# Patient Record
Sex: FEMALE | Race: WHITE | NOT HISPANIC OR LATINO | Employment: OTHER | URBAN - METROPOLITAN AREA
[De-identification: names, ages, dates, MRNs, and addresses within clinical notes are randomized per-mention and may not be internally consistent; named-entity substitution may affect disease eponyms.]

---

## 2023-03-09 ENCOUNTER — TELEPHONE (OUTPATIENT)
Dept: PRIMARY CARE | Facility: CLINIC | Age: 63
End: 2023-03-09
Payer: MEDICARE

## 2023-03-09 LAB
ANION GAP IN SER/PLAS: 13 MMOL/L (ref 10–20)
CALCIUM (MG/DL) IN SER/PLAS: 9.3 MG/DL (ref 8.6–10.3)
CARBON DIOXIDE, TOTAL (MMOL/L) IN SER/PLAS: 34 MMOL/L (ref 21–32)
CHLORIDE (MMOL/L) IN SER/PLAS: 93 MMOL/L (ref 98–107)
CREATININE (MG/DL) IN SER/PLAS: 0.41 MG/DL (ref 0.5–1.05)
GFR FEMALE: >90 ML/MIN/1.73M2
GLUCOSE (MG/DL) IN SER/PLAS: 112 MG/DL (ref 74–99)
POTASSIUM (MMOL/L) IN SER/PLAS: 3.8 MMOL/L (ref 3.5–5.3)
SODIUM (MMOL/L) IN SER/PLAS: 136 MMOL/L (ref 136–145)
UREA NITROGEN (MG/DL) IN SER/PLAS: 5 MG/DL (ref 6–23)

## 2023-03-09 NOTE — TELEPHONE ENCOUNTER
Per HIPAA, Left message on patient's answering machine.        ----- Message from Blane Morales MD sent at 3/9/2023  3:28 PM EST -----  Let her know her sugar is better and she can schedule a nurse visit to come in for a Kenalog injection

## 2023-03-10 ENCOUNTER — TELEPHONE (OUTPATIENT)
Dept: PRIMARY CARE | Facility: CLINIC | Age: 63
End: 2023-03-10

## 2023-03-10 PROBLEM — I25.10 PRECLINICAL CORONARY ARTERY DISEASE: Status: ACTIVE | Noted: 2023-03-10

## 2023-03-10 PROBLEM — M35.00 SICCA SYNDROME (MULTI): Status: ACTIVE | Noted: 2023-03-10

## 2023-03-10 PROBLEM — E11.9 TYPE 2 DIABETES MELLITUS (MULTI): Status: ACTIVE | Noted: 2023-03-10

## 2023-03-10 PROBLEM — D64.9 ANEMIA: Status: ACTIVE | Noted: 2023-03-10

## 2023-03-10 PROBLEM — M85.80 OSTEOPENIA: Status: ACTIVE | Noted: 2023-03-10

## 2023-03-10 PROBLEM — M35.2 BEHCET'S SYNDROME (MULTI): Status: ACTIVE | Noted: 2023-03-10

## 2023-03-10 PROBLEM — K62.5 BRIGHT RED BLOOD PER RECTUM: Status: ACTIVE | Noted: 2023-03-10

## 2023-03-10 PROBLEM — R93.2 ABNORMAL ULTRASOUND OF LIVER: Status: ACTIVE | Noted: 2023-03-10

## 2023-03-10 PROBLEM — G62.9 PERIPHERAL NEUROPATHY: Status: ACTIVE | Noted: 2023-03-10

## 2023-03-10 PROBLEM — M54.50 LOW BACK PAIN: Status: ACTIVE | Noted: 2023-03-10

## 2023-03-10 PROBLEM — K21.9 GERD (GASTROESOPHAGEAL REFLUX DISEASE): Status: ACTIVE | Noted: 2023-03-10

## 2023-03-10 PROBLEM — R73.9 CHRONIC HYPERGLYCEMIA: Status: ACTIVE | Noted: 2023-03-10

## 2023-03-10 PROBLEM — M79.7 FIBROMYALGIA: Status: ACTIVE | Noted: 2023-03-10

## 2023-03-10 PROBLEM — E55.9 VITAMIN D DEFICIENCY: Status: ACTIVE | Noted: 2023-03-10

## 2023-03-10 PROBLEM — E11.49 TYPE 2 DIABETES MELLITUS WITH OTHER DIABETIC NEUROLOGICAL COMPLICATION (MULTI): Status: ACTIVE | Noted: 2023-03-10

## 2023-03-10 PROBLEM — Z79.52 LONG TERM (CURRENT) USE OF SYSTEMIC STEROIDS: Status: ACTIVE | Noted: 2023-03-10

## 2023-03-10 PROBLEM — E09.69: Status: ACTIVE | Noted: 2023-03-10

## 2023-03-10 PROBLEM — R20.2 PARESTHESIA OF RIGHT FOOT: Status: ACTIVE | Noted: 2023-03-10

## 2023-03-10 PROBLEM — F41.1 GENERALIZED ANXIETY DISORDER: Status: ACTIVE | Noted: 2023-03-10

## 2023-03-10 PROBLEM — K12.0 APHTHOUS ULCER: Status: ACTIVE | Noted: 2023-03-10

## 2023-03-10 PROBLEM — B35.1 TINEA UNGUIUM: Status: ACTIVE | Noted: 2023-03-10

## 2023-03-10 PROBLEM — M35.9 CONNECTIVE TISSUE DISEASE, UNDIFFERENTIATED (MULTI): Status: ACTIVE | Noted: 2023-03-10

## 2023-03-10 PROBLEM — E66.3 OVERWEIGHT WITH BODY MASS INDEX (BMI) 25.0-29.9: Status: ACTIVE | Noted: 2023-03-10

## 2023-03-10 PROBLEM — R03.0 BORDERLINE HYPERTENSION: Status: ACTIVE | Noted: 2023-03-10

## 2023-03-10 PROBLEM — Z04.9 CONDITION NOT FOUND: Status: ACTIVE | Noted: 2023-03-10

## 2023-03-10 PROBLEM — E78.2 MIXED HYPERLIPIDEMIA: Status: ACTIVE | Noted: 2023-03-10

## 2023-03-10 RX ORDER — HYDROCHLOROTHIAZIDE 25 MG/1
1 TABLET ORAL DAILY
COMMUNITY
Start: 2016-01-18 | End: 2023-12-12 | Stop reason: SDUPTHER

## 2023-03-10 RX ORDER — TRAZODONE HYDROCHLORIDE 50 MG/1
1 TABLET ORAL NIGHTLY
COMMUNITY
Start: 2021-11-17

## 2023-03-10 RX ORDER — MULTIVITAMIN
TABLET ORAL
COMMUNITY

## 2023-03-10 RX ORDER — EFINACONAZOLE 100 MG/ML
SOLUTION TOPICAL
COMMUNITY
End: 2023-11-02 | Stop reason: SDUPTHER

## 2023-03-10 RX ORDER — VIT C/E/ZN/COPPR/LUTEIN/ZEAXAN 250MG-90MG
CAPSULE ORAL
COMMUNITY
Start: 2014-07-29

## 2023-03-10 RX ORDER — PILOCARPINE HYDROCHLORIDE 5 MG/1
1 TABLET, FILM COATED ORAL 3 TIMES DAILY
COMMUNITY
Start: 2021-11-17

## 2023-03-10 RX ORDER — DIPHENHYDRAMINE HCL 25 MG
1 TABLET ORAL NIGHTLY
COMMUNITY
Start: 2014-08-25

## 2023-03-10 RX ORDER — AMITRIPTYLINE HYDROCHLORIDE 25 MG/1
25 TABLET, FILM COATED ORAL NIGHTLY
COMMUNITY
Start: 2022-01-12 | End: 2023-06-13

## 2023-03-10 RX ORDER — METFORMIN HYDROCHLORIDE 500 MG/1
500 TABLET ORAL
COMMUNITY
Start: 2023-02-18 | End: 2023-04-03 | Stop reason: SDUPTHER

## 2023-03-10 RX ORDER — TRIAMCINOLONE ACETONIDE 40 MG/ML
1.5 INJECTION, SUSPENSION INTRA-ARTICULAR; INTRAMUSCULAR
COMMUNITY
Start: 2016-05-16 | End: 2023-12-12 | Stop reason: ALTCHOICE

## 2023-03-10 RX ORDER — OMEPRAZOLE 40 MG/1
40 CAPSULE, DELAYED RELEASE ORAL
COMMUNITY
Start: 2014-08-25 | End: 2023-12-12 | Stop reason: SDUPTHER

## 2023-03-10 RX ORDER — UBIDECARENONE 30 MG
CAPSULE ORAL
COMMUNITY

## 2023-03-10 RX ORDER — BACLOFEN 5 MG/1
0.5 TABLET ORAL NIGHTLY
COMMUNITY
Start: 2022-07-12

## 2023-03-10 RX ORDER — CALCIUM CARBONATE 500(1250)
TABLET ORAL
COMMUNITY

## 2023-03-10 RX ORDER — HYOSCYAMINE SULFATE 0.12 MG/1
TABLET SUBLINGUAL
COMMUNITY
Start: 2019-08-05

## 2023-03-10 RX ORDER — SERTRALINE HYDROCHLORIDE 50 MG/1
1 TABLET, FILM COATED ORAL 2 TIMES DAILY
COMMUNITY
Start: 2012-11-19 | End: 2023-12-12 | Stop reason: SDUPTHER

## 2023-03-10 RX ORDER — ROSUVASTATIN CALCIUM 20 MG/1
1 TABLET, COATED ORAL DAILY
COMMUNITY
Start: 2018-12-19 | End: 2023-06-05 | Stop reason: SDUPTHER

## 2023-03-10 RX ORDER — PNV NO.95/FERROUS FUM/FOLIC AC 28MG-0.8MG
1 TABLET ORAL DAILY
COMMUNITY
Start: 2014-07-29

## 2023-03-13 ENCOUNTER — CLINICAL SUPPORT (OUTPATIENT)
Dept: PRIMARY CARE | Facility: CLINIC | Age: 63
End: 2023-03-13
Payer: MEDICARE

## 2023-03-13 DIAGNOSIS — M79.7 FIBROMYALGIA: ICD-10-CM

## 2023-03-13 PROCEDURE — 96372 THER/PROPH/DIAG INJ SC/IM: CPT | Performed by: FAMILY MEDICINE

## 2023-03-13 RX ADMIN — TRIAMCINOLONE ACETONIDE 64 MG: 40 INJECTION, SUSPENSION INTRA-ARTICULAR; INTRAMUSCULAR at 09:47

## 2023-03-14 RX ORDER — TRIAMCINOLONE ACETONIDE 40 MG/ML
60 INJECTION, SUSPENSION INTRA-ARTICULAR; INTRAMUSCULAR ONCE
Status: COMPLETED | OUTPATIENT
Start: 2023-03-14 | End: 2023-03-13

## 2023-04-03 ENCOUNTER — TELEPHONE (OUTPATIENT)
Dept: PRIMARY CARE | Facility: CLINIC | Age: 63
End: 2023-04-03
Payer: MEDICARE

## 2023-04-03 DIAGNOSIS — E11.9 TYPE 2 DIABETES MELLITUS WITHOUT COMPLICATION, WITHOUT LONG-TERM CURRENT USE OF INSULIN (MULTI): Primary | ICD-10-CM

## 2023-04-03 RX ORDER — METFORMIN HYDROCHLORIDE 500 MG/1
500 TABLET ORAL
Qty: 180 TABLET | Refills: 3 | Status: SHIPPED | OUTPATIENT
Start: 2023-04-03 | End: 2023-04-03 | Stop reason: SDUPTHER

## 2023-04-03 RX ORDER — METFORMIN HYDROCHLORIDE 500 MG/1
500 TABLET ORAL
Qty: 180 TABLET | Refills: 3 | Status: SHIPPED | OUTPATIENT
Start: 2023-04-03

## 2023-04-03 RX ORDER — METFORMIN HYDROCHLORIDE 500 MG/1
500 TABLET ORAL
Qty: 180 TABLET | Refills: 3 | Status: SHIPPED | OUTPATIENT
Start: 2023-04-03 | End: 2023-04-03 | Stop reason: ENTERED-IN-ERROR

## 2023-04-03 NOTE — TELEPHONE ENCOUNTER
Pt's pharmacy left a msg asking for a refill of the pt's Metformin 500mg.  Pharm is [employer] [facility] ([facility]).

## 2023-06-04 DIAGNOSIS — I25.10 PRECLINICAL CORONARY ARTERY DISEASE: Primary | ICD-10-CM

## 2023-06-05 RX ORDER — ROSUVASTATIN CALCIUM 20 MG/1
TABLET, COATED ORAL
Qty: 90 TABLET | Refills: 1 | Status: SHIPPED | OUTPATIENT
Start: 2023-06-05 | End: 2023-12-12 | Stop reason: SDUPTHER

## 2023-06-12 PROBLEM — Z04.9 CONDITION NOT FOUND: Status: RESOLVED | Noted: 2023-03-10 | Resolved: 2023-06-12

## 2023-06-12 PROBLEM — M35.00 SICCA SYNDROME (MULTI): Status: RESOLVED | Noted: 2023-03-10 | Resolved: 2023-06-12

## 2023-06-12 PROBLEM — K12.0 APHTHOUS ULCER: Status: RESOLVED | Noted: 2023-03-10 | Resolved: 2023-06-12

## 2023-06-12 PROBLEM — E09.69: Status: RESOLVED | Noted: 2023-03-10 | Resolved: 2023-06-12

## 2023-06-12 PROBLEM — E11.9 TYPE 2 DIABETES MELLITUS (MULTI): Status: RESOLVED | Noted: 2023-03-10 | Resolved: 2023-06-12

## 2023-06-12 PROBLEM — B35.1 TINEA UNGUIUM: Status: RESOLVED | Noted: 2023-03-10 | Resolved: 2023-06-12

## 2023-06-12 PROBLEM — M35.9 CONNECTIVE TISSUE DISEASE, UNDIFFERENTIATED (MULTI): Status: RESOLVED | Noted: 2023-03-10 | Resolved: 2023-06-12

## 2023-06-12 PROBLEM — R93.2 ABNORMAL ULTRASOUND OF LIVER: Status: RESOLVED | Noted: 2023-03-10 | Resolved: 2023-06-12

## 2023-06-13 ENCOUNTER — OFFICE VISIT (OUTPATIENT)
Dept: PRIMARY CARE | Facility: CLINIC | Age: 63
End: 2023-06-13
Payer: MEDICARE

## 2023-06-13 VITALS
BODY MASS INDEX: 25.62 KG/M2 | HEART RATE: 102 BPM | DIASTOLIC BLOOD PRESSURE: 78 MMHG | TEMPERATURE: 97.5 F | WEIGHT: 137.8 LBS | OXYGEN SATURATION: 96 % | SYSTOLIC BLOOD PRESSURE: 140 MMHG

## 2023-06-13 DIAGNOSIS — E11.42 TYPE 2 DIABETES MELLITUS WITH DIABETIC POLYNEUROPATHY, WITHOUT LONG-TERM CURRENT USE OF INSULIN (MULTI): Primary | ICD-10-CM

## 2023-06-13 DIAGNOSIS — K21.9 GASTROESOPHAGEAL REFLUX DISEASE WITHOUT ESOPHAGITIS: ICD-10-CM

## 2023-06-13 DIAGNOSIS — M35.2 BEHCET'S SYNDROME (MULTI): ICD-10-CM

## 2023-06-13 DIAGNOSIS — I25.10 PRECLINICAL CORONARY ARTERY DISEASE: ICD-10-CM

## 2023-06-13 DIAGNOSIS — E78.2 MIXED HYPERLIPIDEMIA: ICD-10-CM

## 2023-06-13 DIAGNOSIS — G62.89 OTHER POLYNEUROPATHY: ICD-10-CM

## 2023-06-13 DIAGNOSIS — R03.0 BORDERLINE HYPERTENSION: ICD-10-CM

## 2023-06-13 LAB
POC FINGERSTICK BLOOD GLUCOSE: 170 MG/DL (ref 70–100)
POC HEMOGLOBIN A1C: 5.9 % (ref 4.2–6.5)

## 2023-06-13 PROCEDURE — 3078F DIAST BP <80 MM HG: CPT | Performed by: FAMILY MEDICINE

## 2023-06-13 PROCEDURE — 82947 ASSAY GLUCOSE BLOOD QUANT: CPT | Performed by: FAMILY MEDICINE

## 2023-06-13 PROCEDURE — 99213 OFFICE O/P EST LOW 20 MIN: CPT | Performed by: FAMILY MEDICINE

## 2023-06-13 PROCEDURE — 1036F TOBACCO NON-USER: CPT | Performed by: FAMILY MEDICINE

## 2023-06-13 PROCEDURE — 3051F HG A1C>EQUAL 7.0%<8.0%: CPT | Performed by: FAMILY MEDICINE

## 2023-06-13 PROCEDURE — 96372 THER/PROPH/DIAG INJ SC/IM: CPT | Performed by: FAMILY MEDICINE

## 2023-06-13 PROCEDURE — 83036 HEMOGLOBIN GLYCOSYLATED A1C: CPT | Performed by: FAMILY MEDICINE

## 2023-06-13 PROCEDURE — 82962 GLUCOSE BLOOD TEST: CPT | Performed by: FAMILY MEDICINE

## 2023-06-13 PROCEDURE — 3077F SYST BP >= 140 MM HG: CPT | Performed by: FAMILY MEDICINE

## 2023-06-13 RX ORDER — TRIAMCINOLONE ACETONIDE 40 MG/ML
60 INJECTION, SUSPENSION INTRA-ARTICULAR; INTRAMUSCULAR ONCE
Status: COMPLETED | OUTPATIENT
Start: 2023-06-13 | End: 2023-06-13

## 2023-06-13 RX ADMIN — TRIAMCINOLONE ACETONIDE 64 MG: 40 INJECTION, SUSPENSION INTRA-ARTICULAR; INTRAMUSCULAR at 10:29

## 2023-06-13 ASSESSMENT — ENCOUNTER SYMPTOMS
ROS GI COMMENTS: SEE HPI
ENDOCRINE COMMENTS: SEE HPI
ARTHRALGIAS: 1
MYALGIAS: 1
BACK PAIN: 1

## 2023-06-13 NOTE — PATIENT INSTRUCTIONS
It has been a pleasure taking care of you over the years and keep up the good work on knocking off pounds.

## 2023-06-13 NOTE — PROGRESS NOTES
Subjective   Patient ID: Martha Rajput is a 63 y.o. female who presents for 3 month follow up fibromyalgia.    She is here principally to follow-up on the inflammation that she gets as a result of Behcet's disease.  She also complains of symptoms characteristic of fibromyalgia but the fact that she gets such great relief with a shot of Kenalog points to an inflammatory process.  We are also following her for type 2 diabetes and this is a concern when we are using injections of Kenalog.  She is tolerating the metformin fairly well but does get some digestive symptoms.    She is also being followed for preclinical coronary artery disease, GERD, peripheral neuropathy, hyperlipidemia and vitamin D deficiency.    I told her that I am going to be retiring and she will need to find a physician who is willing to work with her as far as the steroids.  She has tried all kinds of anti-inflammatories, does not want any pain medication and it is been a long road of frustration.         Review of Systems   Cardiovascular:         See HPI   Gastrointestinal:         See HPI   Endocrine:        See HPI   Musculoskeletal:  Positive for arthralgias, back pain and myalgias.   Neurological:         See HPI   All other systems reviewed and are negative.      Objective   /78 (BP Location: Left arm, Patient Position: Sitting, BP Cuff Size: Adult)   Pulse 102   Temp 36.4 °C (97.5 °F) (Skin)   Wt 62.5 kg (137 lb 12.8 oz)   SpO2 96%   BMI 25.62 kg/m²     Physical Exam  Cardiovascular:      Rate and Rhythm: Normal rate and regular rhythm.   Pulmonary:      Effort: Pulmonary effort is normal.      Breath sounds: Normal breath sounds.      Comments: Scattered coarse crackles  Abdominal:      General: There is no distension.      Palpations: There is no mass.   Neurological:      General: No focal deficit present.      Mental Status: She is alert and oriented to person, place, and time.   Psychiatric:         Mood and Affect: Mood  normal.         Behavior: Behavior normal.         Assessment/Plan   Problem List Items Addressed This Visit       Behcet's syndrome (CMS/HCC)     Months to a steroid injection every 3 months         Relevant Medications    triamcinolone acetonide (Kenalog-40) injection 64 mg (Start on 6/13/2023 10:30 AM)    Borderline hypertension    GERD (gastroesophageal reflux disease)    Peripheral neuropathy    Preclinical coronary artery disease    Mixed hyperlipidemia    RESOLVED: Type 2 diabetes mellitus (CMS/HCC) - Primary    Relevant Orders    POCT Fingerstick Glucose manually resulted    POCT glycosylated hemoglobin (Hb A1C) manually resulted   Her glucose was elevated but she had a big sandwich this morning.  Her A1c is under good control and we can go ahead and give her a shot of Kenalog.  I referred her to a good family physician in [facility] since I am retiring in 3 months.  We hope that they may continue to give her a Kenalog injection every 3 months because its only thing that gives her relief.  But that will be up to their discretion.

## 2023-09-13 ENCOUNTER — APPOINTMENT (OUTPATIENT)
Dept: PRIMARY CARE | Facility: CLINIC | Age: 63
End: 2023-09-13
Payer: MEDICARE

## 2023-09-25 ENCOUNTER — OFFICE VISIT (OUTPATIENT)
Dept: PRIMARY CARE | Facility: CLINIC | Age: 63
End: 2023-09-25
Payer: MEDICARE

## 2023-09-25 VITALS
TEMPERATURE: 97.3 F | SYSTOLIC BLOOD PRESSURE: 120 MMHG | DIASTOLIC BLOOD PRESSURE: 82 MMHG | BODY MASS INDEX: 24.57 KG/M2 | WEIGHT: 132.2 LBS

## 2023-09-25 DIAGNOSIS — Z23 ENCOUNTER FOR IMMUNIZATION: Primary | ICD-10-CM

## 2023-09-25 DIAGNOSIS — Z91.018 FOOD ALLERGY: ICD-10-CM

## 2023-09-25 PROCEDURE — 90686 IIV4 VACC NO PRSV 0.5 ML IM: CPT | Performed by: INTERNAL MEDICINE

## 2023-09-25 PROCEDURE — 1036F TOBACCO NON-USER: CPT | Performed by: INTERNAL MEDICINE

## 2023-09-25 PROCEDURE — 3051F HG A1C>EQUAL 7.0%<8.0%: CPT | Performed by: INTERNAL MEDICINE

## 2023-09-25 PROCEDURE — G0008 ADMIN INFLUENZA VIRUS VAC: HCPCS | Performed by: INTERNAL MEDICINE

## 2023-09-25 PROCEDURE — 3079F DIAST BP 80-89 MM HG: CPT | Performed by: INTERNAL MEDICINE

## 2023-09-25 PROCEDURE — 3074F SYST BP LT 130 MM HG: CPT | Performed by: INTERNAL MEDICINE

## 2023-09-25 PROCEDURE — 99213 OFFICE O/P EST LOW 20 MIN: CPT | Performed by: INTERNAL MEDICINE

## 2023-09-25 RX ORDER — DULOXETIN HYDROCHLORIDE 20 MG/1
20 CAPSULE, DELAYED RELEASE ORAL DAILY
COMMUNITY
Start: 2023-07-24 | End: 2023-12-12 | Stop reason: ALTCHOICE

## 2023-09-25 ASSESSMENT — PATIENT HEALTH QUESTIONNAIRE - PHQ9
SUM OF ALL RESPONSES TO PHQ9 QUESTIONS 1 AND 2: 0
2. FEELING DOWN, DEPRESSED OR HOPELESS: NOT AT ALL
1. LITTLE INTEREST OR PLEASURE IN DOING THINGS: NOT AT ALL

## 2023-09-25 NOTE — PROGRESS NOTES
Subjective   Patient ID: [id number]   HPI    Martha Rajput is a 63 y.o. female who presents for Transfer Of Care (From Dr Fair ), Med Refill, Referral (For allergist), and Flu Vaccine (Wasn't sure if she could after having a procedure down 3 weeks ago).  She is allergic to everything and even to lot of foods. She is asking to see allergy specialist.      Objective   Visit Vitals  /82 (BP Location: Left arm, Patient Position: Sitting)   Temp 36.3 °C (97.3 °F) (Temporal)      Review of Systems  All 12 systems reviewed, no other abnormality except that mentioned in HPI.    Physical Exam  Constitutional- no abnormality  ENT- no abnormality  Neck- no swelling  CVS- normal S1 and S2, no murmur.  Pulmonary- clear to auscultation,no rhonchi, no wheezes.  Abdomen- normal- liver and spleen, soft, no distension, bowel sound present.  Neurological- all cranial nerves intact, speech and gait normal, no sensory or motor deficiency.  Musculoskeletal- all pulses are normal, normal movement, no joint swelling.  Skin- no rash, dry.  psychiatry- no suicidal ideation.  Lymph node- no lymphadenopathy      Assessment/Plan   Problem List Items Addressed This Visit    None  Visit Diagnoses       Encounter for immunization    -  Primary    Relevant Orders    Flu vaccine, quadrivalent, recombinant, preservative free, adult (FLUBLOK)    Food allergy        Relevant Orders    Referral to Allergy            Dillon Mora MD

## 2023-11-02 ENCOUNTER — OFFICE VISIT (OUTPATIENT)
Dept: PODIATRY | Facility: CLINIC | Age: 63
End: 2023-11-02
Payer: MEDICARE

## 2023-11-02 DIAGNOSIS — M79.674 TOE PAIN, RIGHT: ICD-10-CM

## 2023-11-02 DIAGNOSIS — E11.65 POORLY CONTROLLED DIABETES MELLITUS (MULTI): ICD-10-CM

## 2023-11-02 DIAGNOSIS — B35.1 TINEA UNGUIUM: Primary | ICD-10-CM

## 2023-11-02 PROCEDURE — 11720 DEBRIDE NAIL 1-5: CPT | Performed by: PODIATRIST

## 2023-11-02 PROCEDURE — 3051F HG A1C>EQUAL 7.0%<8.0%: CPT | Performed by: PODIATRIST

## 2023-11-02 PROCEDURE — 1036F TOBACCO NON-USER: CPT | Performed by: PODIATRIST

## 2023-11-02 PROCEDURE — 3074F SYST BP LT 130 MM HG: CPT | Performed by: PODIATRIST

## 2023-11-02 PROCEDURE — 3079F DIAST BP 80-89 MM HG: CPT | Performed by: PODIATRIST

## 2023-11-02 RX ORDER — EFINACONAZOLE 100 MG/ML
SOLUTION TOPICAL
Qty: 4 ML | Refills: 3 | Status: SHIPPED | OUTPATIENT
Start: 2023-11-02

## 2023-11-02 NOTE — PROGRESS NOTES
CC:  painful thickened and elongated toenails and diabetic care.     HPI: Pt presents for dm foot exam and painful thickened and elongated toenails that are difficult to manage.  Onset was gradual with worsening course until recently.  Aggravated by shoe gear and ambulation.       PCP: Dr. Mora  Last visit: 9-25-23     PMH  Past Medical History:   Diagnosis Date    Acute bronchitis due to other specified organisms 11/30/2017    Acute bacterial bronchitis    Anxiety disorder, unspecified 09/02/2013    Anxiety disorder    Impaired fasting glucose 09/02/2013    Impaired fasting glucose    Interstitial cystitis (chronic) without hematuria 11/17/2021    Chronic interstitial cystitis    Other conditions influencing health status 09/02/2013    Allergic Rhinitis Due To Animals    Other conditions influencing health status 09/02/2013    Inflammatory Myopathy (Myositis)    Other specified diseases of liver 09/02/2013    Hepatic cyst    Personal history of other diseases of the musculoskeletal system and connective tissue 11/17/2021    History of fibromyositis    Personal history of other diseases of the nervous system and sense organs     History of carpal tunnel syndrome    Personal history of other diseases of the nervous system and sense organs     History of chronic fatigue syndrome    Personal history of other diseases of the respiratory system     Personal history of asthma    Personal history of other endocrine, nutritional and metabolic disease     History of hypercholesterolemia    Sprain of joints and ligaments of unspecified parts of neck, initial encounter 09/02/2013    Neck sprain     MEDS    Current Outpatient Medications:     baclofen (Lioresal) 5 mg tablet, Take 0.5 tablets (2.5 mg) by mouth once daily at bedtime., Disp: , Rfl:     calcium 500 mg calcium (1,250 mg) tablet, Calcium 500-2.5 mg-mcg oral tablet chewable, Disp: , Rfl:     cholecalciferol (Vitamin D-3) 25 MCG (1000 UT) capsule, TAKE AS DIRECTED.,  Disp: , Rfl:     co-enzyme Q-10 30 mg capsule, , Disp: , Rfl:     cyanocobalamin (Vitamin B-12) 100 mcg tablet, Take 1 tablet (100 mcg) by mouth once daily., Disp: , Rfl:     diphenhydrAMINE (Benadryl Allergy) 25 mg tablet, Take 1 tablet (25 mg) by mouth once daily at bedtime., Disp: , Rfl:     DULoxetine (Cymbalta) 20 mg DR capsule, Take 1 capsule (20 mg) by mouth once daily., Disp: , Rfl:     hydroCHLOROthiazide (HYDRODiuril) 25 mg tablet, Take 1 tablet (25 mg) by mouth once daily., Disp: , Rfl:     hyoscyamine 0.125 mg SL tablet, PLACE 1 TABLET UNDER THE TONGUE  3 TIMES DAILY AS NEEDED., Disp: , Rfl:     Jublia 10 % solution with applicator, APPLY TOPICALLY TO THE AFFECTED AREA DAILY FOR 48 WEEKS, Disp: , Rfl:     metFORMIN (Glucophage) 500 mg tablet, Take 1 tablet (500 mg) by mouth in the morning and 1 tablet (500 mg) in the evening. Take with meals., Disp: 180 tablet, Rfl: 3    multivitamin tablet, , Disp: , Rfl:     omeprazole (PriLOSEC) 40 mg DR capsule, Take 1 capsule (40 mg) by mouth. Take before a meal, Disp: , Rfl:     pilocarpine (Salagen) 5 mg tablet, Take 1 tablet (5 mg) by mouth 3 times a day., Disp: , Rfl:     pseudoephedrine HCl (SUDAFED ORAL), Sudafed tabs, Disp: , Rfl:     rosuvastatin (Crestor) 20 mg tablet, TAKE 1 TABLET EVERY DAY, Disp: 90 tablet, Rfl: 1    sertraline (Zoloft) 50 mg tablet, Take 1 tablet (50 mg) by mouth 2 times a day., Disp: , Rfl:     traZODone (Desyrel) 50 mg tablet, Take 1 tablet (50 mg) by mouth once daily at bedtime., Disp: , Rfl:     triamcinolone acetonide (Kenalog) 40 mg/mL injection, Inject 1.5 mL (60 mg) into the muscle., Disp: , Rfl:   Allergies  Allergies   Allergen Reactions    Aspirin Unknown    Codeine Unknown     Codeine derivatives    Fish Containing Products Other    Motrin [Ibuprofen] Unknown    Shellfish Derived Other    Tree Nuts Other     Social History     Socioeconomic History    Marital status:      Spouse name: Not on file    Number of  children: Not on file    Years of education: Not on file    Highest education level: Not on file   Occupational History    Not on file   Tobacco Use    Smoking status: Former     Packs/day: .5     Types: Cigarettes     Quit date:      Years since quittin.8     Passive exposure: Past    Smokeless tobacco: Never   Substance and Sexual Activity    Alcohol use: Yes     Alcohol/week: 14.0 standard drinks of alcohol     Types: 14 Glasses of wine per week    Drug use: Not Currently    Sexual activity: Not on file   Other Topics Concern    Not on file   Social History Narrative    Not on file     Social Determinants of Health     Financial Resource Strain: Not on file   Food Insecurity: Not on file   Transportation Needs: Not on file   Physical Activity: Not on file   Stress: Not on file   Social Connections: Not on file   Intimate Partner Violence: Not on file   Housing Stability: Not on file     Family History   Problem Relation Name Age of Onset    Diabetes Mother      Asthma Father      Ulcerative colitis Father      Hypertension Father      Ulcerative colitis Sister      Asthma Other family history     Diabetes Other family history     Osteoarthritis Other family history     Rheum arthritis Other family history      Past Surgical History:   Procedure Laterality Date    CARPAL TUNNEL RELEASE  2021    Neuroplasty Decompression Median Nerve At Carpal Tunnel    TUBAL LIGATION  2021    Tubal Ligation       REVIEW OF SYSTEMS  DERM:   + as noted in HPI.       Physical examination:   On General Observation: Patient is a pleasant, cooperative, well developed 63 y.o. diabetic   adult female. The patient is alert and oriented to time, place and person. Patient has normal affect and mood.  There were no vitals taken for this visit.    Vascular:  DP and PT pulses are 2/4 b/l.  mild edema noted. no varicosities b/l.  CFT  5 seconds to all digits bilateral.  Skin temperature is warm to warm from proximal to  distal bilateral.      Muscular: Strength is 5/5 for all instrinsic and extrinsic muscle groups.     Neuro:  Proprioception present.   Sensation to vibration is  present. Protective sensation present  at all pedal sites via Beattie Shanna 5.07 monofilament bilateral.  Light touch present bilateral.     Derm:      Right toenails: 1-5 Brittleness, crumbling upon debridement, subungual debris, elongation, mycotic appearance, tenderness, and thickness.   Hair growth is decreased b/l le    ASSESSMENT:    Tinea Unguium [B35.1]   E11.65  Pain in right toe(s) [M79.674]         PLAN:   DM Foot exam N/C  Debrided nails 1-5 right in length and height  Will establish with dr. Fan  Follow up laser right foot      Eduardo Crain DPM

## 2023-11-03 DIAGNOSIS — B35.1 TINEA UNGUIUM: ICD-10-CM

## 2023-11-03 RX ORDER — EFINACONAZOLE 100 MG/ML
SOLUTION TOPICAL
Qty: 4 ML | Refills: 3 | Status: CANCELLED | OUTPATIENT
Start: 2023-11-03

## 2023-11-06 ENCOUNTER — TELEPHONE (OUTPATIENT)
Dept: PRIMARY CARE | Facility: CLINIC | Age: 63
End: 2023-11-06
Payer: MEDICARE

## 2023-11-06 NOTE — TELEPHONE ENCOUNTER
Denied   11/2/2023  6:44 PM  Case ID: [id number] Appeal supported: No  Note from payer: The drug you asked for is not listed in your preferred drug list (formulary). The preferred drug(s), you may not have tried are: terbinafine HCl tablet, itraconazole capsule AND griseofulvin ultramicrosize tablet. Your provider needs to give us medical reasons why the preferred drug(s) would not work for you and/or would have bad side effects. Sometimes a preferred drug needs more review for approval. Additionally, some preferred drugs listed may be the same drugs with different strengths or forms. [employer] may only require one strength or form of that drug to be tried. This decision was from [email or web address] [employer] Non-Formulary Exceptions Coverage Policy.   Payer: [employer] - Medicare    [phone]      Waiting for Payer Response   11/2/2023  4:35 PM  Case ID: [id number] Reply deadline: November 2, 2024 2:17 PM Sending user: Felipa Ojeda MA   Payer: [employer] - [employer]    [phone]   [employer] Electronic PA Form   [employer] Prior Authorizations      Waiting for Payer Response   11/2/2023  2:16 PM  Case ID: [id number] Reply deadline: November 2, 2024 10:39 AM Sending user: Felipa Ojeda MA   Payer: [employer] - [employer]    [phone]   [employer] Electronic PA Form   [employer] Prior Authorizations

## 2023-12-12 ENCOUNTER — OFFICE VISIT (OUTPATIENT)
Dept: PRIMARY CARE | Facility: CLINIC | Age: 63
End: 2023-12-12
Payer: MEDICARE

## 2023-12-12 VITALS
WEIGHT: 134 LBS | BODY MASS INDEX: 25.3 KG/M2 | HEART RATE: 96 BPM | SYSTOLIC BLOOD PRESSURE: 117 MMHG | TEMPERATURE: 97.1 F | DIASTOLIC BLOOD PRESSURE: 77 MMHG | RESPIRATION RATE: 16 BRPM | HEIGHT: 61 IN

## 2023-12-12 DIAGNOSIS — E11.42 TYPE 2 DIABETES MELLITUS WITH DIABETIC POLYNEUROPATHY, WITHOUT LONG-TERM CURRENT USE OF INSULIN (MULTI): ICD-10-CM

## 2023-12-12 DIAGNOSIS — E55.9 VITAMIN D DEFICIENCY: ICD-10-CM

## 2023-12-12 DIAGNOSIS — E11.9 TYPE 2 DIABETES MELLITUS WITHOUT COMPLICATION, WITHOUT LONG-TERM CURRENT USE OF INSULIN (MULTI): ICD-10-CM

## 2023-12-12 DIAGNOSIS — K21.9 GASTROESOPHAGEAL REFLUX DISEASE, UNSPECIFIED WHETHER ESOPHAGITIS PRESENT: Primary | ICD-10-CM

## 2023-12-12 DIAGNOSIS — E78.5 HYPERLIPIDEMIA, UNSPECIFIED HYPERLIPIDEMIA TYPE: ICD-10-CM

## 2023-12-12 DIAGNOSIS — I25.10 PRECLINICAL CORONARY ARTERY DISEASE: ICD-10-CM

## 2023-12-12 DIAGNOSIS — K13.70 ORAL LESION: ICD-10-CM

## 2023-12-12 DIAGNOSIS — I10 HYPERTENSION, UNSPECIFIED TYPE: ICD-10-CM

## 2023-12-12 PROCEDURE — 3051F HG A1C>EQUAL 7.0%<8.0%: CPT | Performed by: FAMILY MEDICINE

## 2023-12-12 PROCEDURE — 3074F SYST BP LT 130 MM HG: CPT | Performed by: FAMILY MEDICINE

## 2023-12-12 PROCEDURE — 3078F DIAST BP <80 MM HG: CPT | Performed by: FAMILY MEDICINE

## 2023-12-12 PROCEDURE — 1036F TOBACCO NON-USER: CPT | Performed by: FAMILY MEDICINE

## 2023-12-12 PROCEDURE — 99214 OFFICE O/P EST MOD 30 MIN: CPT | Performed by: FAMILY MEDICINE

## 2023-12-12 RX ORDER — SERTRALINE HYDROCHLORIDE 50 MG/1
50 TABLET, FILM COATED ORAL DAILY
Qty: 90 TABLET | Refills: 3 | Status: SHIPPED | OUTPATIENT
Start: 2023-12-12 | End: 2024-12-11

## 2023-12-12 RX ORDER — ACETAMINOPHEN 500 MG
TABLET ORAL EVERY 24 HOURS
COMMUNITY

## 2023-12-12 RX ORDER — TRIAMCINOLONE ACETONIDE 1 MG/G
PASTE DENTAL
Qty: 5 G | Refills: 2 | Status: SHIPPED | OUTPATIENT
Start: 2023-12-12

## 2023-12-12 RX ORDER — HYDROCHLOROTHIAZIDE 25 MG/1
25 TABLET ORAL DAILY
Qty: 90 TABLET | Refills: 3 | Status: SHIPPED | OUTPATIENT
Start: 2023-12-12 | End: 2024-12-11

## 2023-12-12 RX ORDER — OMEPRAZOLE 40 MG/1
40 CAPSULE, DELAYED RELEASE ORAL
Qty: 90 CAPSULE | Refills: 3 | Status: SHIPPED | OUTPATIENT
Start: 2023-12-12 | End: 2024-12-11

## 2023-12-12 RX ORDER — ROSUVASTATIN CALCIUM 20 MG/1
20 TABLET, COATED ORAL DAILY
Qty: 90 TABLET | Refills: 3 | Status: SHIPPED | OUTPATIENT
Start: 2023-12-12 | End: 2024-12-11

## 2023-12-12 NOTE — PROGRESS NOTES
Subjective   Patient ID: Martha Rajput is a 63 y.o. female who presents for New Patient Visit.  HPI  Used to follow with Dr. Fair before he retired.   Has hx of inflammation , Behcet's disease , fibromyalgia, Sjogren's   Takes kenalog 40 mg every 3 months   Recently started following with rheumatologist Laquita Khan CNP who continued steroid injections.   She has been evaluated by allergist in the past because she has A lot of allergies, however could not stop the antihistamine to do the testing.   So she is careful.     She follows with GYN perradha     Allergic to everything       She tried several treamtent in the past, The only things that helps is the injected steroids , she is aware or risk of osteoprosis , gastritis , and metabolic problem with steroids , hwoever she wants to continue treamtent.   She reported that her rheumatologist will order her DEXA>     Last colonoscopy Dr. Davies 2021, colonscopy , repeat in 5 years.               Review of Systems    Past Medical History:   Diagnosis Date    Acute bronchitis due to other specified organisms 11/30/2017    Acute bacterial bronchitis    Anxiety disorder, unspecified 09/02/2013    Anxiety disorder    Impaired fasting glucose 09/02/2013    Impaired fasting glucose    Interstitial cystitis (chronic) without hematuria 11/17/2021    Chronic interstitial cystitis    Other conditions influencing health status 09/02/2013    Allergic Rhinitis Due To Animals    Other conditions influencing health status 09/02/2013    Inflammatory Myopathy (Myositis)    Other specified diseases of liver 09/02/2013    Hepatic cyst    Personal history of other diseases of the musculoskeletal system and connective tissue 11/17/2021    History of fibromyositis    Personal history of other diseases of the nervous system and sense organs     History of carpal tunnel syndrome    Personal history of other diseases of the nervous system and sense organs     History of chronic fatigue  syndrome    Personal history of other diseases of the respiratory system     Personal history of asthma    Personal history of other endocrine, nutritional and metabolic disease     History of hypercholesterolemia    Sprain of joints and ligaments of unspecified parts of neck, initial encounter 2013    Neck sprain       Past Surgical History:   Procedure Laterality Date    CARPAL TUNNEL RELEASE  2021    Neuroplasty Decompression Median Nerve At Carpal Tunnel    TUBAL LIGATION  2021    Tubal Ligation      Social History     Socioeconomic History    Marital status:      Spouse name: None    Number of children: None    Years of education: None    Highest education level: None   Occupational History    None   Tobacco Use    Smoking status: Former     Packs/day: .5     Types: Cigarettes     Quit date:      Years since quittin.9     Passive exposure: Past    Smokeless tobacco: Never   Substance and Sexual Activity    Alcohol use: Yes     Alcohol/week: 14.0 standard drinks of alcohol     Types: 14 Glasses of wine per week    Drug use: Not Currently     Types: Marijuana    Sexual activity: None   Other Topics Concern    None   Social History Narrative    None     Social Determinants of Health     Financial Resource Strain: Not on file   Food Insecurity: Not on file   Transportation Needs: Not on file   Physical Activity: Not on file   Stress: Not on file   Social Connections: Not on file   Intimate Partner Violence: Not on file   Housing Stability: Not on file      Family History   Problem Relation Name Age of Onset    Diabetes Mother      Asthma Father      Ulcerative colitis Father      Hypertension Father      Ulcerative colitis Sister      Asthma Other family history     Diabetes Other family history     Osteoarthritis Other family history     Rheum arthritis Other family history        MEDICATIONS AND ALLERGIES:    ALLERGIES Aspirin, Codeine, Fish containing products, Motrin  [ibuprofen], Shellfish derived, and Tree nuts    MEDICATIONS   Current Outpatient Medications on File Prior to Visit   Medication Sig Dispense Refill    baclofen (Lioresal) 5 mg tablet Take 0.5 tablets (2.5 mg) by mouth once daily at bedtime.      calcium 500 mg calcium (1,250 mg) tablet Calcium 500-2.5 mg-mcg oral tablet chewable      cholecalciferol (Vitamin D-3) 25 MCG (1000 UT) capsule TAKE AS DIRECTED.      co-enzyme Q-10 30 mg capsule       cyanocobalamin (Vitamin B-12) 100 mcg tablet Take 1 tablet (100 mcg) by mouth once daily.      diphenhydrAMINE (Benadryl Allergy) 25 mg tablet Take 1 tablet (25 mg) by mouth once daily at bedtime.      hydroCHLOROthiazide (HYDRODiuril) 25 mg tablet Take 1 tablet (25 mg) by mouth once daily.      hyoscyamine 0.125 mg SL tablet PLACE 1 TABLET UNDER THE TONGUE  3 TIMES DAILY AS NEEDED.      metFORMIN (Glucophage) 500 mg tablet Take 1 tablet (500 mg) by mouth in the morning and 1 tablet (500 mg) in the evening. Take with meals. 180 tablet 3    multivitamin tablet       omeprazole (PriLOSEC) 40 mg DR capsule Take 1 capsule (40 mg) by mouth. Take before a meal      pilocarpine (Salagen) 5 mg tablet Take 1 tablet (5 mg) by mouth 3 times a day.      rosuvastatin (Crestor) 20 mg tablet TAKE 1 TABLET EVERY DAY 90 tablet 1    sertraline (Zoloft) 50 mg tablet Take 1 tablet (50 mg) by mouth 2 times a day.      traZODone (Desyrel) 50 mg tablet Take 1 tablet (50 mg) by mouth once daily at bedtime.      [DISCONTINUED] triamcinolone acetonide (Kenalog) 40 mg/mL injection Inject 1.5 mL (60 mg) into the muscle.      Jublia 10 % solution with applicator APPLY TOPICALLY TO THE AFFECTED AREA DAILY FOR 48 WEEKS 4 mL 3    [DISCONTINUED] DULoxetine (Cymbalta) 20 mg DR capsule Take 1 capsule (20 mg) by mouth once daily.      [DISCONTINUED] pseudoephedrine HCl (SUDAFED ORAL) Sudafed tabs       No current facility-administered medications on file prior to visit.        Objective   Visit Vitals  BP  "117/77   Pulse 96   Temp 36.2 °C (97.1 °F)   Resp 16   Ht 1.549 m (5' 1\")   Wt 60.8 kg (134 lb)   BMI 25.32 kg/m²   Smoking Status Former   BSA 1.62 m²      Physical Exam  Constitutional: awake; alert, interactive; in no acute distress; well nourished and well developed  ENT: ears and nose were normal in appearance;  Eyes: pupils equal and round  Pulmonary: no respiratory distress and normal respiratory rhythm and effort  Skin: normal skin color and pigmentation;  Psychiatric: oriented to person, place, and time; affect was normal and the mood was normal     1. Preclinical coronary artery disease  She is rosuvastatin , well tolerated.   - rosuvastatin (Crestor) 20 mg tablet; Take 1 tablet (20 mg) by mouth once daily.  Dispense: 90 tablet; Refill: 3    2. Gastroesophageal reflux disease, unspecified whether esophagitis present  Continye PPI   Follows with Dr. Davies GI   - omeprazole (PriLOSEC) 40 mg DR capsule; Take 1 capsule (40 mg) by mouth once daily in the morning. Take before meals.  Dispense: 90 capsule; Refill: 3  - sertraline (Zoloft) 50 mg tablet; Take 1 tablet (50 mg) by mouth once daily.  Dispense: 90 tablet; Refill: 3  - rosuvastatin (Crestor) 20 mg tablet; Take 1 tablet (20 mg) by mouth once daily.  Dispense: 90 tablet; Refill: 3  - hydroCHLOROthiazide (HYDRODiuril) 25 mg tablet; Take 1 tablet (25 mg) by mouth once daily.  Dispense: 90 tablet; Refill: 3    3. Hyperlipidemia, unspecified hyperlipidemia type  Stable   Will check levels   - omeprazole (PriLOSEC) 40 mg DR capsule; Take 1 capsule (40 mg) by mouth once daily in the morning. Take before meals.  Dispense: 90 capsule; Refill: 3  - sertraline (Zoloft) 50 mg tablet; Take 1 tablet (50 mg) by mouth once daily.  Dispense: 90 tablet; Refill: 3  - rosuvastatin (Crestor) 20 mg tablet; Take 1 tablet (20 mg) by mouth once daily.  Dispense: 90 tablet; Refill: 3  - hydroCHLOROthiazide (HYDRODiuril) 25 mg tablet; Take 1 tablet (25 mg) by mouth once daily.  " Dispense: 90 tablet; Refill: 3    4. Hypertension, unspecified type  Well controlled on current txt   - omeprazole (PriLOSEC) 40 mg DR capsule; Take 1 capsule (40 mg) by mouth once daily in the morning. Take before meals.  Dispense: 90 capsule; Refill: 3  - sertraline (Zoloft) 50 mg tablet; Take 1 tablet (50 mg) by mouth once daily.  Dispense: 90 tablet; Refill: 3  - rosuvastatin (Crestor) 20 mg tablet; Take 1 tablet (20 mg) by mouth once daily.  Dispense: 90 tablet; Refill: 3  - hydroCHLOROthiazide (HYDRODiuril) 25 mg tablet; Take 1 tablet (25 mg) by mouth once daily.  Dispense: 90 tablet; Refill: 3    5. Type 2 diabetes mellitus with diabetic polyneuropathy, without long-term current use of insulin (CMS/HCC)  Will recheck A1C   Continue metformin   - CBC; Future  - Comprehensive Metabolic Panel; Future  - Lipid Panel; Future  - TSH with reflex to Free T4 if abnormal; Future  - Vitamin D 25-Hydroxy,Total (for eval of Vitamin D levels); Future  - Hemoglobin A1C; Future  - Albumin, urine, random; Future    6. Type 2 diabetes mellitus without complication, without long-term current use of insulin (CMS/HCC)      7. Vitamin D deficiency  Will cehck   - Vitamin D 25-Hydroxy,Total (for eval of Vitamin D levels); Future    8. Oral lesion  Hx of dry mouth ,and behcet , will treat with kenalog topical , following with rheumatology later on today   - triamcinolone (Kenalog) 0.1 % oral paste; Apply to the inside of the lips twice daily for 2 weeks  Dispense: 5 g; Refill: 2

## 2024-12-04 ENCOUNTER — LAB (OUTPATIENT)
Dept: LAB | Facility: LAB | Age: 64
End: 2024-12-04
Payer: MEDICARE

## 2024-12-04 DIAGNOSIS — B35.1 TINEA UNGUIUM: Primary | ICD-10-CM

## 2024-12-04 LAB
ALT SERPL W P-5'-P-CCNC: 19 U/L (ref 7–45)
AST SERPL W P-5'-P-CCNC: 22 U/L (ref 9–39)

## 2024-12-04 PROCEDURE — 84450 TRANSFERASE (AST) (SGOT): CPT

## 2024-12-04 PROCEDURE — 84460 ALANINE AMINO (ALT) (SGPT): CPT

## 2024-12-04 PROCEDURE — 36415 COLL VENOUS BLD VENIPUNCTURE: CPT

## 2025-08-01 DIAGNOSIS — I10 HYPERTENSION, UNSPECIFIED TYPE: ICD-10-CM

## 2025-08-01 DIAGNOSIS — K21.9 GASTROESOPHAGEAL REFLUX DISEASE, UNSPECIFIED WHETHER ESOPHAGITIS PRESENT: ICD-10-CM

## 2025-08-01 DIAGNOSIS — E78.5 HYPERLIPIDEMIA, UNSPECIFIED HYPERLIPIDEMIA TYPE: ICD-10-CM

## 2025-08-04 RX ORDER — HYDROCHLOROTHIAZIDE 25 MG/1
25 TABLET ORAL DAILY
Qty: 90 TABLET | Refills: 3 | OUTPATIENT
Start: 2025-08-04